# Patient Record
Sex: FEMALE | Race: WHITE
[De-identification: names, ages, dates, MRNs, and addresses within clinical notes are randomized per-mention and may not be internally consistent; named-entity substitution may affect disease eponyms.]

---

## 2020-01-17 ENCOUNTER — HOSPITAL ENCOUNTER (OUTPATIENT)
Dept: HOSPITAL 62 - RAD | Age: 45
End: 2020-01-17
Payer: COMMERCIAL

## 2020-01-17 DIAGNOSIS — Z98.1: Primary | ICD-10-CM

## 2020-01-17 PROCEDURE — 72040 X-RAY EXAM NECK SPINE 2-3 VW: CPT

## 2020-01-17 NOTE — RADIOLOGY REPORT (SQ)
EXAM DESCRIPTION:  CERV SP 3 VIEW OR LESS



COMPLETED DATE/TIME:  1/17/2020 5:59 pm



REASON FOR STUDY:  (Z98.1)ARTHRODESIS STATUS Z98.1  ARTHRODESIS STATUS



COMPARISON:  None.



NUMBER OF VIEWS:  Three views.



TECHNIQUE:  AP, lateral and odontoid radiographic images acquired of the cervical spine.



LIMITATIONS:  None.



FINDINGS:  Patient is status post posterior decompression and fusion C3 through C6 with anterior plat
e and screws C4 through C6, anterior screws and disc prosthesis C3- 4, interlaminar fixation C3 throu
gh C6.  Bony fusion C4 through C6.  Alignment is anatomic.



IMPRESSION:  Satisfactory postop spine.



TECHNICAL DOCUMENTATION:  JOB ID:  [id number]

 2011 [employer]- All Rights Reserved



Reading location - IP/workstation name: [email or web address]